# Patient Record
Sex: FEMALE | Race: WHITE | NOT HISPANIC OR LATINO | Employment: FULL TIME | URBAN - METROPOLITAN AREA
[De-identification: names, ages, dates, MRNs, and addresses within clinical notes are randomized per-mention and may not be internally consistent; named-entity substitution may affect disease eponyms.]

---

## 2018-06-09 ENCOUNTER — HOSPITAL ENCOUNTER (EMERGENCY)
Facility: CLINIC | Age: 38
Discharge: HOME | End: 2018-06-09
Attending: PHYSICIAN ASSISTANT | Admitting: PHYSICIAN ASSISTANT
Payer: COMMERCIAL

## 2018-06-09 ENCOUNTER — APPOINTMENT (OUTPATIENT)
Dept: GENERAL RADIOLOGY | Facility: CLINIC | Age: 38
End: 2018-06-09
Attending: PHYSICIAN ASSISTANT
Payer: COMMERCIAL

## 2018-06-09 VITALS
RESPIRATION RATE: 18 BRPM | OXYGEN SATURATION: 98 % | DIASTOLIC BLOOD PRESSURE: 96 MMHG | TEMPERATURE: 99 F | SYSTOLIC BLOOD PRESSURE: 160 MMHG

## 2018-06-09 DIAGNOSIS — J06.9 UPPER RESPIRATORY TRACT INFECTION, UNSPECIFIED TYPE: ICD-10-CM

## 2018-06-09 LAB
DEPRECATED S PYO AG THROAT QL EIA: NORMAL
SPECIMEN SOURCE: NORMAL

## 2018-06-09 PROCEDURE — 87081 CULTURE SCREEN ONLY: CPT | Performed by: PHYSICIAN ASSISTANT

## 2018-06-09 PROCEDURE — 99284 EMERGENCY DEPT VISIT MOD MDM: CPT | Mod: 25

## 2018-06-09 PROCEDURE — 71046 X-RAY EXAM CHEST 2 VIEWS: CPT

## 2018-06-09 PROCEDURE — 94640 AIRWAY INHALATION TREATMENT: CPT

## 2018-06-09 PROCEDURE — 25000125 ZZHC RX 250: Performed by: PHYSICIAN ASSISTANT

## 2018-06-09 PROCEDURE — 87880 STREP A ASSAY W/OPTIC: CPT | Performed by: PHYSICIAN ASSISTANT

## 2018-06-09 RX ORDER — ALBUTEROL SULFATE 90 UG/1
2 AEROSOL, METERED RESPIRATORY (INHALATION) EVERY 4 HOURS PRN
Qty: 1 INHALER | Refills: 0 | Status: SHIPPED | OUTPATIENT
Start: 2018-06-09

## 2018-06-09 RX ORDER — BENZONATATE 200 MG/1
200 CAPSULE ORAL 3 TIMES DAILY PRN
Qty: 21 CAPSULE | Refills: 0 | Status: SHIPPED | OUTPATIENT
Start: 2018-06-09

## 2018-06-09 RX ORDER — IPRATROPIUM BROMIDE AND ALBUTEROL SULFATE 2.5; .5 MG/3ML; MG/3ML
3 SOLUTION RESPIRATORY (INHALATION) ONCE
Status: COMPLETED | OUTPATIENT
Start: 2018-06-09 | End: 2018-06-09

## 2018-06-09 RX ADMIN — IPRATROPIUM BROMIDE AND ALBUTEROL SULFATE 3 ML: .5; 3 SOLUTION RESPIRATORY (INHALATION) at 17:48

## 2018-06-09 ASSESSMENT — ENCOUNTER SYMPTOMS
HEADACHES: 1
RHINORRHEA: 1
TROUBLE SWALLOWING: 1
VOMITING: 1
FEVER: 1
SORE THROAT: 1
COUGH: 1
APPETITE CHANGE: 0
SHORTNESS OF BREATH: 1

## 2018-06-09 NOTE — LETTER
June 9, 2018      To Whom It May Concern:      Ammy Todd was seen in our Emergency Department today, 06/09/18. Please excuse her from work tomorrow(6/10) and Monday (6/11). I expect her condition to improve over the next 2-3 days.  She may return to work/school when improved.    Sincerely,        Joyce Tran PA-C

## 2018-06-09 NOTE — ED PROVIDER NOTES
History     Chief Complaint:  Pharyngitis and Shortness of Breath    HPI   Ammy Todd is a 38 year old female with a history of allergy induced asthma, diabetes, fibromyalgia, high cholesterol and hypertension who presents to the emergency department today with a cough over the past 1 week and a sore throat and shortness of breath that started today. Associated symptoms include headache, congestion, bilateral ear pain, rhinorrhea, painful swallowing, and low grade fever measured highest at 100.5  F. The patient has not used any Tylenol or Ibuprofen. For her current symptoms, the patient has been using DayQuil, NyQuil and Mucinex, without much relief. The patient states that her  is sick with similar symptoms She denies hx of strep exposure. She denies any recent travel. The patient denies ongoing vomiting, abdominal pain, diarrhea, trouble swallowing, or any other acute symptoms. Of note the patient has not been taking her diabetes or hypertension medications as she has been busy with work and has not made it to her doctor for recheck.     Allergies:  No known drug allergies.    Medications:    Beclomethasone Dipropionate (QVAR IN)  Cetirizine HCl (ZYRTEC ALLERGY PO)  acetaminophen-codeine (TYLENOL/CODEINE #3) 300-30 MG per tablet  Cyanocobalamin (B-12) 250 MCG TABS  drospirenone-ethinyl estradiol (NEAL) 3-0.03 MG per tablet  fluticasone (FLONASE) 50 MCG/ACT nasal spray  Gabapentin (NEURONTIN PO)  LISINOPRIL PO  MetFORMIN HCl (GLUCOPHAGE PO)  Triamcinolone Acetonide (KENALOG EX)     Past Medical History:    Allergic rhinitis  Diabetes  Eczema  Fibromyalgia  High cholesterol  Hypertension  Asthma    Past Surgical History:    The patient does not have any pertinent past surgical history.    Family History:    No past pertinent family history.    Social History:  Smoking Status: Current Every Day Smoker   Smokeless Tobacco: Never used  Alcohol Use: Negative  Marital Status:       Review of  Systems   Constitutional: Positive for fever. Negative for appetite change.   HENT: Positive for congestion, ear pain, rhinorrhea, sore throat and trouble swallowing.    Respiratory: Positive for cough and shortness of breath.    Gastrointestinal: Positive for vomiting.   Neurological: Positive for headaches.   All other systems reviewed and are negative.    Physical Exam     Patient Vitals for the past 24 hrs:   BP Temp Temp src Heart Rate Resp SpO2   06/09/18 1723 (!) 160/96 99  F (37.2  C) Oral 110 18 98 %     Physical Exam  General: Resting comfortably.  Alert and oriented.   Head:  The scalp, face, and head appear normal   Eyes:  Conjunctivae and sclerae are normal   ENT:    The oropharynx is normal    Uvula is in the midline    Structures are symmetric. Mild erythema noted to the posterior oropharynx.    No tonsillar hypertrophy or exudate.    No trismus or airway compromise.   Neck:  No lymphadenopathy    Normal ROM.   CV:  Regular rate and rhythm     Normal S1/S2    No pathological murmur detected  Resp:  Lungs are clear to auscultation    No rales or wheezing    No tachypnea and no respiratory distress    Equal air entry throughout    Expiratory cough noted  MS:  Normal muscular tone   Skin:  No rash or acute skin lesions noted   Neuro: Speech is normal and fluent.     Emergency Department Course     Imaging:  Chest Xray  No acute disease.  As per radiology.    Laboratory:  Rapid Strep Screen: Negative  Beta Strep Culture: In process    Interventions:  1748 DuoNeb 3 mL Nebulization    Emergency Department Course:  Nursing notes and vitals reviewed.  1735 I performed an exam of the patient as documented above.   Swap of the throat taken. This was sent to lab for testing, results above.  Nebulization medications were administered as documented above.  The patient was sent for a chest xray while in the emergency department, findings above.     1820 I informed the patient of negative lab and imaging results. We  discussed outpatient management.    I personally reviewed the laboratory results with the patient and answered all related questions prior to discharge.   Findings and plan explained to the patient. Patient discharged home with instructions regarding supportive care, medications, and reasons to return. The importance of close follow-up was reviewed.     Impression & Plan      Medical Decision Making:  Ammy Todd is a 38 year old female who presents for evaluation of a sore throat, shortness of breath and a cough.   The patient presents hypertensive, but otherwise vitally stable.  She has also had mild associated URI symptoms. This is consistent with an upper respiratory tract infection.  CXR was obtained and was negative. There is no signs at this point of serious bacterial infection such as OM, RPA, epiglottitis, PTA, strep pharyngitis, pneumonia, sinusitis, meningitis, bacteremia. Rapid strep screen was negative; cultures pending. Nebulization treatment was given here in the ED with the patient noting improvement in her symptoms. No signs of dehydration. The patient was instructed that she will be contact in 2-3 days if her strep culture returns positive. I think she is safe to be discharged home. She was given prescriptions for albuterol, tessalon and Claritin. She was asked to follow-up with her primary care doctor in 2-3 days for recheck.  I also discussed with the patient the importance of taking her diabetes and blood pressure medications.  She does plan to address this with her primary care doctor in the next few days.  She is asked to return immediately for uncontrolled fever, vomiting, worsening shortness of breath, chest pain, or any other concerning symptoms.  All questions were answered prior to discharge.  Patient understands and agrees to this plan.      Diagnosis:    ICD-10-CM    1. Upper respiratory tract infection, unspecified type J06.9      Disposition:  Discharged    Discharge  Medications:  Discharge Medication List as of 6/9/2018  6:41 PM      START taking these medications    Details   albuterol (PROAIR HFA) 108 (90 Base) MCG/ACT Inhaler Inhale 2 puffs into the lungs every 4 hours as needed for shortness of breath / dyspnea, Disp-1 Inhaler, R-0, Local Print      benzonatate (TESSALON) 200 MG capsule Take 1 capsule (200 mg) by mouth 3 times daily as needed for cough, Disp-21 capsule, R-0, Local Print      loratadine-pseudoePHEDrine (CLARITIN-D 24-HOUR)  MG per 24 hr tablet Take 1 tablet by mouth daily, Disp-5 tablet, R-0, Local Print           Scribe Discloser:  I, Tadeo oCsta, am serving as a scribe on 6/9/2018 at 5:41 PM to personally document services performed by Joyce Tran PA based on my observations and the provider's statements to me.     6/9/2018   [facility] EMERGENCY DEPARTMENT       Joyce Tran PA-C  06/09/18 1935

## 2018-06-09 NOTE — ED AVS SNAPSHOT
[facility] Emergency Department    [address]    [facility] [address]    Phone:  [phone]    Fax:  [phone]                                       Ammy Todd   MRN: [id number]    Department:  [facility] Emergency Department   Date of Visit:  6/9/2018           Patient Information     Date Of Birth          1980        Your diagnoses for this visit were:     Upper respiratory tract infection, unspecified type        You were seen by Joyce Tran PA-C.      Follow-up Information     Follow up with Nima Robles In 2 days.    Why:  Recheck    Contact information:    [address] [facility] [address] [phone]          Follow up with [facility] Emergency Department.    Specialty:  EMERGENCY MEDICINE    Why:  If symptoms worsen    Contact information:    [address]  [facility] [address]  [phone]        Discharge Instructions       Discharge Instructions  Upper Respiratory Infection    The upper respiratory tract includes the sinuses, nasal passages, pharynx, and larynx. A URI, or upper respiratory infection, is an infection of any of the parts of the upper airway. Symptoms include runny nose, congestion, sneezing, sore throat, cough, and fever. URIs are almost always caused by a virus. Antibiotics do not help with viral infections, so are generally not prescribed. A URI is very contagious through coughing and nasal secretions; make sure you wash your hands often and clean surfaces after sneezing, coughing or touching them. While you should start to improve in 3 - 5 days, remember that sometimes a cough can linger for several weeks.    Generally, every Emergency Department visit should have a follow-up clinic visit with either a primary or a specialty clinic/provider. Please follow-up as instructed by your emergency provider today.    Return to the Emergency Department if:    Any of your  symptoms get much worse.    You seem very sick, like being too weak to get up.    You have chest pain or shortness of breath.     You have a severe headache.    You are vomiting (throwing up) so much you cannot keep fluids or medicines down.    You have confusion or seem unusually drowsy.    You have a seizure.    What can I do to help myself?    Fill any prescriptions the provider gave you and take them right away    If you have a fever, get plenty of rest and drink lots of fluids, especially water.    Using a humidifier or saline nose spray will also help loosen mucous.     Clothes or blankets will not change your fever. Do what is comfortable for you.    Bathing or sponging in lukewarm water may help you feel better.    Acetaminophen (Tylenol ) or ibuprofen (Advil , Motrin ) will help bring fever down and may help you feel more comfortable. Be sure to read and follow the package directions, and ask your provider if you have questions.    Do not drink alcohol.    Decongestants may help you feel better. You may use decongestant nose sprays Afrin  (oxymetazoline) or Ramy-Synephrine  (phenylephrine hydrochloride) for up to 3 days, or may use a decongestant tablet like Sudafed  (pseudoephedrine).  If you were given a prescription for medicine here today, be sure to read all of the information (including the package insert) that comes with your prescription.  This will include important information about the medicine, its side effects, and any warnings that you need to know about.  The pharmacist who fills the prescription can provide more information and answer questions you may have about the medicine.  If you have questions or concerns that the pharmacist cannot address, please call or return to the Emergency Department.   Remember that you can always come back to the Emergency Department if you are not able to see your regular provider in the amount of time listed above, if you get any new symptoms, or if there is  anything that worries you.      24 Hour Appointment Hotline       To make an appointment at any [facility], call [phone] ([phone]). If you don't have a family doctor or clinic, we will help you find one. [place] clinics are conveniently located to serve the needs of you and your family.             Review of your medicines      START taking        Dose / Directions Last dose taken    albuterol 108 (90 Base) MCG/ACT Inhaler   Commonly known as:  PROAIR HFA   Dose:  2 puff   Quantity:  1 Inhaler        Inhale 2 puffs into the lungs every 4 hours as needed for shortness of breath / dyspnea   Refills:  0        benzonatate 200 MG capsule   Commonly known as:  TESSALON   Dose:  200 mg   Quantity:  21 capsule        Take 1 capsule (200 mg) by mouth 3 times daily as needed for cough   Refills:  0        loratadine-pseudoePHEDrine  MG per 24 hr tablet   Commonly known as:  CLARITIN-D 24-hour   Dose:  1 tablet   Quantity:  5 tablet        Take 1 tablet by mouth daily   Refills:  0          Our records show that you are taking the medicines listed below. If these are incorrect, please call your family doctor or clinic.        Dose / Directions Last dose taken    acetaminophen-codeine 300-30 MG per tablet   Commonly known as:  TYLENOL WITH CODEINE #3   Dose:  1 tablet   Quantity:  14 tablet        Take 1 tablet by mouth every 6 hours as needed for pain   Refills:  0        B-12 250 MCG Tabs        Refills:  0        drospirenone-ethinyl estradiol 3-0.03 MG per tablet   Commonly known as:  NEAL   Dose:  1 tablet        Take 1 tablet by mouth daily   Refills:  0        fluticasone 50 MCG/ACT spray   Commonly known as:  FLONASE   Dose:  2 spray        Spray 2 sprays into both nostrils daily   Refills:  0        GLUCOPHAGE PO        Refills:  0        KENALOG EX        Refills:  0        LISINOPRIL PO        Refills:  0        NEURONTIN PO        Refills:  0        QVAR IN        Refills:  0         ZYRTEC ALLERGY PO        Refills:  0                Prescriptions were sent or printed at these locations (3 Prescriptions)                   Other Prescriptions                Printed at Department/Unit printer (3 of 3)         albuterol (PROAIR HFA) 108 (90 Base) MCG/ACT Inhaler               benzonatate (TESSALON) 200 MG capsule               loratadine-pseudoePHEDrine (CLARITIN-D 24-HOUR)  MG per 24 hr tablet                Procedures and tests performed during your visit     Beta strep group A culture    Chest XR,  PA & LAT    Rapid strep screen      Orders Needing Specimen Collection     None      Pending Results     Date and Time Order Name Status Description    6/9/2018 1746 Beta strep group A culture In process     6/9/2018 1743 Chest XR,  PA & LAT Preliminary             Pending Culture Results     Date and Time Order Name Status Description    6/9/2018 1746 Beta strep group A culture In process             Pending Results Instructions     If you had any lab results that were not finalized at the time of your Discharge, you can call the ED Lab Result RN at [phone]. You will be contacted by this team for any positive Lab results or changes in treatment. The nurses are available 7 days a week from 10A to 6:30P.  You can leave a message 24 hours per day and they will return your call.        Test Results From Your Hospital Stay        6/9/2018  6:09 PM      Narrative     CHEST TWO VIEWS 6/9/2018 5:53 PM     HISTORY: Cold symptoms for five days, cough, shortness of breath  today.    COMPARISON: None.     FINDINGS: There are no acute infiltrates. The cardiac silhouette is  not enlarged. Pulmonary vasculature is unremarkable.        Impression     IMPRESSION: No acute disease.          6/9/2018  5:57 PM      Component Results     Component    Specimen Description    Throat    Rapid Strep A Screen    NEGATIVE: No Group A streptococcal antigen detected by immunoassay, await culture report.          6/9/2018  5:57 PM                Clinical Quality Measure: Blood Pressure Screening     Your blood pressure was checked while you were in the emergency department today. The last reading we obtained was  BP: (!) 160/96 . Please read the guidelines below about what these numbers mean and what you should do about them.  If your systolic blood pressure (the top number) is less than 120 and your diastolic blood pressure (the bottom number) is less than 80, then your blood pressure is normal. There is nothing more that you need to do about it.  If your systolic blood pressure (the top number) is 120-139 or your diastolic blood pressure (the bottom number) is 80-89, your blood pressure may be higher than it should be. You should have your blood pressure rechecked within a year by a primary care provider.  If your systolic blood pressure (the top number) is 140 or greater or your diastolic blood pressure (the bottom number) is 90 or greater, you may have high blood pressure. High blood pressure is treatable, but if left untreated over time it can put you at risk for heart attack, stroke, or kidney failure. You should have your blood pressure rechecked by a primary care provider within the next 4 weeks.  If your provider in the emergency department today gave you specific instructions to follow-up with your doctor or provider even sooner than that, you should follow that instruction and not wait for up to 4 weeks for your follow-up visit.        Thank you for choosing [facility]       Thank you for choosing [facility] for your care. Our goal is always to provide you with excellent care. Hearing back from our patients is one way we can continue to improve our services. Please take a few minutes to complete the written survey that you may receive in the mail after you visit with us. Thank you!        [employer] Information     [employer] lets you send messages to your doctor, view your test results, renew your prescriptions, schedule  "appointments and more. To sign up, go to [email or web address] . Click on \"Log in\" on the left side of the screen, which will take you to the Welcome page. Then click on \"Sign up Now\" on the right side of the page.     You will be asked to enter the access code listed below, as well as some personal information. Please follow the directions to create your username and password.     Your access code is: [email or web address]  Expires: 2018  6:41 PM     Your access code will  in 90 days. If you need help or a new code, please call your [facility] clinic or [phone].        Care EveryWhere ID     This is your Care EveryWhere ID. This could be used by other organizations to access your [facility] medical records  [id number]        Equal Access to Services     PATRICIA PACHECO : Eren Hopkins, david zavala, rowdy hodges, lizzie nance. So [facility] [phone].    ATENCIÓN: Si habla español, tiene a tarango disposición servicios gratuitos de asistencia lingüística. Llame al [phone].    We comply with applicable federal civil rights laws and [place] laws. We do not discriminate on the basis of race, color, national origin, age, disability, sex, sexual orientation, or gender identity.            After Visit Summary       This is your record. Keep this with you and show to your community pharmacist(s) and doctor(s) at your next visit.                  "

## 2018-06-09 NOTE — DISCHARGE INSTRUCTIONS

## 2018-06-09 NOTE — ED TRIAGE NOTES
Pt has has cold symptoms x1 week. Today sore throat and SOB. + cough. Taking mucinex, has not taken anything for pain.

## 2018-06-09 NOTE — ED AVS SNAPSHOT
[facility] Emergency Department    [address]    [facility] [address]    Phone:  [phone]    Fax:  [phone]                                       Ammy Todd   MRN: [id number]    Department:  [facility] Emergency Department   Date of Visit:  6/9/2018           After Visit Summary Signature Page     I have received my discharge instructions, and my questions have been answered. I have discussed any challenges I see with this plan with the nurse or doctor.    ..........................................................................................................................................  Patient/Patient Representative Signature      ..........................................................................................................................................  Patient Representative Print Name and Relationship to Patient    ..................................................               ................................................  Date                                            Time    ..........................................................................................................................................  Reviewed by Signature/Title    ...................................................              ..............................................  Date                                                            Time

## 2018-06-11 LAB
BACTERIA SPEC CULT: NORMAL
Lab: NORMAL
SPECIMEN SOURCE: NORMAL

## 2020-11-29 ENCOUNTER — VIRTUAL VISIT (OUTPATIENT)
Dept: FAMILY MEDICINE | Facility: OTHER | Age: 40
End: 2020-11-29
Payer: COMMERCIAL

## 2020-11-29 DIAGNOSIS — Z20.822 SUSPECTED COVID-19 VIRUS INFECTION: Primary | ICD-10-CM

## 2020-11-29 PROCEDURE — 99421 OL DIG E/M SVC 5-10 MIN: CPT | Performed by: PHYSICIAN ASSISTANT

## 2020-11-29 NOTE — PROGRESS NOTES
"Date: 2020 01:12:52  Clinician: Radha Parr  Clinician NPI: [id number]  Patient: Ammy Todd  Patient : 1980  Patient Address: [address] [place] [address]  Patient Phone: [phone]  Visit Protocol: URI  Patient Summary:  Ammy is a 40 year old ( : 1980 ) female who initiated a [facility] Visit for COVID-19 (Coronavirus) evaluation and screening. When asked the question \"Please sign me up to receive news, health information and promotions from [facility].\", Ammy responded \"No\".    Ammy states her symptoms started 1-2 days ago.   Her symptoms consist of ear pain, a headache, a cough, nasal congestion, facial pain or pressure, myalgia, chills, malaise, tooth pain, and ageusia. She is experiencing mild difficulty breathing with activities but can speak normally in full sentences.   Symptom details     Nasal secretions: The color of her mucus is green, yellow, and clear.    Cough: Ammy coughs a few times an hour and her cough is not more bothersome at night. Phlegm does not come into her throat when she coughs. She does not believe her cough is caused by post-nasal drip.     Facial pain or pressure: The facial pain or pressure feels worse when bending over or leaning forward.     Headache: She states the headache is mild (1-3 on a 10 point pain scale).     Tooth pain: The tooth pain is not caused by a cavity, recent dental work, or other mouth problems.      Ammy denies having wheezing, fever, nausea, vomiting, rhinitis, sore throat, diarrhea, and anosmia. She also denies having recent facial or sinus surgery in the past 60 days and having a sinus infection within the past year.   Precipitating events  She has not recently been exposed to someone with influenza. Ammy has been in close contact with the following high risk individuals: people with asthma, heart disease or diabetes, immunocompromised people, and adults 65 or older.   Pertinent COVID-19 (Coronavirus) information  " Ammy does not work or volunteer as healthcare worker or a . 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   Ammy has had a close contact with a laboratory-confirmed COVID-19 patient within 14 days of symptom onset. She was exposed at her work. She does not know when she was exposed to the laboratory-confirmed COVID-19 patient.   Additional information about contact with COVID-19 (Coronavirus) patient as reported by the patient (free text): Stef Ott, staff at [place], Daniela Aragon and Maximus Calabrese clients living in [facility].    Since December 2019, Ammy has not been tested for COVID-19 and has not had upper respiratory infection or influenza-like illness.   Pertinent medical history  Ammy has diabetes. She has been told by her provider that her diabetes is not in control.   Ammy has asthma. She uses quick-relief inhaler less than two times per week. She refills her quick-relief inhaler less than two times per year. She wakes up at night with asthma symptoms less than two times per month.   Ammy has taken an antibiotic medication in the past month. Antibiotic details as reported by the patient (free text): Mucinex, congestion   She has not been told by her provider to avoid NSAIDs.   Ammy does not get yeast infections when she takes antibiotics.   She denies having immunosuppressive conditions (e.g., chemotherapy, HIV, organ transplant, long-term use of steroids or other immunosuppressive medications, splenectomy). She does not have severe COPD and congestive heart failure.   Ammy needs a return to work/school note.   Weight: 220 lbs   Ammy does not smoke or use smokeless tobacco.   She denies pregnancy and denies breastfeeding. She is  currently menstruating.   Weight: 220 lbs    MEDICATIONS: Wal-Zyr (cetirizine) oral, metformin oral, Prozac oral, ALLERGIES: NKDA  Clinician Response:  Dear Ammy,  Your health is our priority. Based on the information you have provided, it is possible that you may have some type of viral infection.  Please read the full treatment plan and see my recommendations below.  Medication information  Because you have a viral infection, antibiotics will not help you get better. Treating a viral infection with antibiotics could actually make you feel worse.  Self care  Steps you can take to be as comfortable as possible:     Rest.    Drink plenty of fluids.    Take a warm shower to loosen congestion    Use a cool-mist humidifier.    Take a spoonful of honey to reduce your cough.     As a reminder, your primary care provider is the best person to help you with your diabetes management. Knowing how and when to take your medication, how to monitor your blood sugar (glucose), and how to take care of yourself, helps you manage your diabetes better.   Please make an appointment with your primary care provider to follow up with any outstanding diabetes questions.   Additional treatment plan   Your symptoms show that you may have coronavirus (COVID-19). This illness can cause fever, cough and trouble breathing. Many people get a mild case and get better on their own. Some people can get very sick.  What should I do?  We would like to test you for this virus.   1. Please call [phone] to schedule your visit. Explain that you were referred by [facility] to have a COVID-19 test. Be ready to share your [facility] visit ID number.  * If you need to schedule in [facility] please call [phone] or for [employer] employees please call [phone].  * If you need to schedule in the [place] area please call [phone]. [employer] employees call [phone].  The following will serve as your written order for this COVID Test, ordered by me, for  "the indication of suspected COVID [Z20.828]: The test will be ordered in [employer], our electronic health record, after you are scheduled. It will show as ordered and authorized by Robert Parra MD.  Order: COVID-19 (Coronavirus) PCR for SYMPTOMATIC [facility].   2. When it's time for your COVID test:  Stay at least 6 feet away from others. (If someone will drive you to your test, stay in the backseat, as far away from the  as you can.)   Cover your mouth and nose with a mask, tissue or washcloth.  Go straight to the testing site. Don't make any stops on the way there or back.      3.Starting now: Stay home and away from others (self-isolate) until:   You've had no fever---and no medicine that reduces fever---for one full day (24 hours). And...   Your other symptoms have gotten better. For example, your cough or breathing has improved. And...   At least 10 days have passed since your symptoms started.       During this time, don't leave the house except for testing or medical care.   Stay in your own room, even for meals. Use your own bathroom if you can.   Stay away from others in your home. No hugging, kissing or shaking hands. No visitors.  Don't go to work, school or anywhere else.    Clean \"high touch\" surfaces often (doorknobs, counters, handles, etc.). Use a household cleaning spray or wipes. You'll find a full list of  on the EPA website: www.epa.gov/pesticide-registration/list-n-disinfectants-use-against-sars-cov-2.   Cover your mouth and nose with a mask, tissue or washcloth to avoid spreading germs.  Wash your hands and face often. Use soap and water.  Caregivers in these groups are at risk for severe illness due to COVID-19:  o People 65 years and older  o People who live in a nursing home or long-term care facility  o People with chronic disease (lung, heart, cancer, diabetes, kidney, liver, immunologic)  o People who have a weakened immune system, including those who:   Are in cancer " treatment  Take medicine that weakens the immune system, such as corticosteroids  Had a bone marrow or organ transplant  Have an immune deficiency  Have poorly controlled HIV or AIDS  Are obese (body mass index of 40 or higher)  Smoke regularly   o Caregivers should wear gloves while washing dishes, handling laundry and cleaning bedrooms and bathrooms.  o Use caution when washing and drying laundry: Don't shake dirty laundry, and use the warmest water setting that you can.  o For more tips, go to www.cdc.gov/coronavirus/2019-ncov/downloads/10Things.pdf.    4.Sign up for [employer]. We know it's scary to hear that you might have COVID-19. We want to track your symptoms to make sure you're okay over the next 2 weeks. Please look for an email from [employer]---this is a free, online program that we'll use to keep in touch. To sign up, follow the link in the email. Learn more at [email or web address]  How can I take care of myself?   Get lots of rest. Drink extra fluids (unless a doctor has told you not to).   Take Tylenol (acetaminophen) for fever or pain. If you have liver or kidney problems, ask your family doctor if it's okay to take Tylenol.   Adults can take either:    650 mg (two 325 mg pills) every 4 to 6 hours, or...   1,000 mg (two 500 mg pills) every 8 hours as needed.    Note: Don't take more than 3,000 mg in one day. Acetaminophen is found in many medicines (both prescribed and over-the-counter medicines). Read all labels to be sure you don't take too much.   For children, check the Tylenol bottle for the right dose. The dose is based on the child's age or weight.    If you have other health problems (like cancer, heart failure, an organ transplant or severe kidney disease): Call your specialty clinic if you don't feel better in the next 2 days.       Know when to call 911. Emergency warning signs include:    Trouble breathing or shortness of breath Pain or pressure in the chest that doesn't  go away Feeling confused like you haven't felt before, or not being able to wake up Bluish-colored lips or face.  Where can I get more information?   [facility] -- About COVID-19: [email or web address]   CDC -- What to Do If You're Sick: www.cdc.gov/coronavirus/2019-ncov/about/steps-when-sick.html   CDC -- Ending Home Isolation: www.cdc.gov/coronavirus/2019-ncov/hcp/disposition-in-home-patients.html   CDC -- Caring for Someone: www.cdc.gov/coronavirus/2019-ncov/if-you-are-sick/care-for-someone.html   [facility] -- Interim Guidance for Hospital Discharge to Home: www.[email or web address].mn./diseases/coronavirus/hcp/hospdischarge.pdf   [facility] clinical trials (COVID-19 research studies): clinicalaffairs.[facility]-clinical-trials    Below are the COVID-19 hotlines at the [place] Department of Health ([facility]). Interpreters are available.    For health questions: Call 550-838-1617 or 1-956.764.1809 (7 a.m. to 7 p.m.) For questions about schools and childcare: Call 912-470-1264 or 1-633.508.9439 (7 a.m. to 7 p.m.)    COVID-19 (Coronavirus) General Information  Because there is currently no vaccine to prevent infection, the best way to protect yourself is to avoid being exposed to this virus. Common symptoms of COVID-19 include but are not limited to fever, cough, and shortness of breath. These symptoms appear 2-14 days after you are exposed to the virus that causes COVID-19. Click here for more information from the CDC on how to protect yourself.  If you are sick with COVID-19 or suspect you are infected with the virus that causes COVID-19, follow the steps here from the CDC to help prevent the disease from spreading to people in your home and community.  Click here for general information from the CDC on testing.  If you develop any of these emergency warning signs for COVID-19, get medical attention immediately:     Trouble breathing    Persistent pain or pressure in the chest    New confusion or  inability to arouse    Bluish lips or face      Call your doctor or clinic before going in. Call 911 if you have a medical emergency and notify the  you have or think you may have COVID-19.  For more detailed and up to date information on COVID-19 (Coronavirus), please visit the CDC website.   Diagnosis: Cough  Diagnosis ICD: R05

## 2020-11-30 DIAGNOSIS — Z20.822 SUSPECTED COVID-19 VIRUS INFECTION: ICD-10-CM

## 2020-11-30 PROCEDURE — U0003 INFECTIOUS AGENT DETECTION BY NUCLEIC ACID (DNA OR RNA); SEVERE ACUTE RESPIRATORY SYNDROME CORONAVIRUS 2 (SARS-COV-2) (CORONAVIRUS DISEASE [COVID-19]), AMPLIFIED PROBE TECHNIQUE, MAKING USE OF HIGH THROUGHPUT TECHNOLOGIES AS DESCRIBED BY CMS-2020-01-R: HCPCS | Performed by: PHYSICIAN ASSISTANT

## 2020-12-01 LAB
SARS-COV-2 RNA SPEC QL NAA+PROBE: ABNORMAL
SPECIMEN SOURCE: ABNORMAL

## 2021-01-14 ENCOUNTER — HEALTH MAINTENANCE LETTER (OUTPATIENT)
Age: 41
End: 2021-01-14

## 2021-10-23 ENCOUNTER — HEALTH MAINTENANCE LETTER (OUTPATIENT)
Age: 41
End: 2021-10-23

## 2022-02-12 ENCOUNTER — HEALTH MAINTENANCE LETTER (OUTPATIENT)
Age: 42
End: 2022-02-12

## 2022-02-16 ENCOUNTER — APPOINTMENT (OUTPATIENT)
Dept: CT IMAGING | Facility: CLINIC | Age: 42
End: 2022-02-16
Attending: EMERGENCY MEDICINE
Payer: OTHER MISCELLANEOUS

## 2022-02-16 ENCOUNTER — HOSPITAL ENCOUNTER (EMERGENCY)
Facility: CLINIC | Age: 42
Discharge: HOME | End: 2022-02-16
Attending: EMERGENCY MEDICINE | Admitting: EMERGENCY MEDICINE
Payer: OTHER MISCELLANEOUS

## 2022-02-16 VITALS
TEMPERATURE: 96.6 F | HEART RATE: 94 BPM | DIASTOLIC BLOOD PRESSURE: 94 MMHG | RESPIRATION RATE: 18 BRPM | SYSTOLIC BLOOD PRESSURE: 165 MMHG | OXYGEN SATURATION: 98 %

## 2022-02-16 DIAGNOSIS — S10.93XA CONTUSION OF FACE, SCALP AND NECK, INITIAL ENCOUNTER: ICD-10-CM

## 2022-02-16 DIAGNOSIS — S00.83XA CONTUSION OF FACE, SCALP AND NECK, INITIAL ENCOUNTER: ICD-10-CM

## 2022-02-16 DIAGNOSIS — S00.03XA CONTUSION OF FACE, SCALP AND NECK, INITIAL ENCOUNTER: ICD-10-CM

## 2022-02-16 DIAGNOSIS — S09.90XA CLOSED HEAD INJURY, INITIAL ENCOUNTER: ICD-10-CM

## 2022-02-16 PROCEDURE — 99285 EMERGENCY DEPT VISIT HI MDM: CPT | Mod: 25

## 2022-02-16 PROCEDURE — 70450 CT HEAD/BRAIN W/O DYE: CPT

## 2022-02-16 PROCEDURE — 70486 CT MAXILLOFACIAL W/O DYE: CPT

## 2022-02-16 PROCEDURE — 93005 ELECTROCARDIOGRAM TRACING: CPT

## 2022-02-16 ASSESSMENT — ENCOUNTER SYMPTOMS
NECK PAIN: 1
EYE PAIN: 0
VOMITING: 0
NUMBNESS: 1
NAUSEA: 0
ABDOMINAL PAIN: 0
ARTHRALGIAS: 1

## 2022-02-17 LAB
ATRIAL RATE - MUSE: 86 BPM
DIASTOLIC BLOOD PRESSURE - MUSE: NORMAL MMHG
INTERPRETATION ECG - MUSE: NORMAL
P AXIS - MUSE: 32 DEGREES
PR INTERVAL - MUSE: 156 MS
QRS DURATION - MUSE: 92 MS
QT - MUSE: 356 MS
QTC - MUSE: 426 MS
R AXIS - MUSE: 18 DEGREES
SYSTOLIC BLOOD PRESSURE - MUSE: NORMAL MMHG
T AXIS - MUSE: 34 DEGREES
VENTRICULAR RATE- MUSE: 86 BPM

## 2022-02-17 NOTE — ED PROVIDER NOTES
History   Chief Complaint:  Head Injury       HPI   Ammy Todd is a 41 year old female who presents after slipping and falling on ice a couple hours ago. She landed on her back and hit her head. She is not sure if she lost consciousness. She was able to get up and walk after. She has some jaw pain and neck pain. She mentions having some numbness in her teeth but no misalignment or dental pain. She denies nausea, vomiting, speech, or vision changes. She denies ear pain or discharge.  She denies abdominal or chest pain.     Review of Systems   Eyes: Negative for pain.   Cardiovascular: Negative for chest pain.   Gastrointestinal: Negative for abdominal pain, nausea and vomiting.   Musculoskeletal: Positive for arthralgias (jaw pain) and neck pain.   Neurological: Positive for numbness. Negative for syncope.   All other systems reviewed and are negative.        Allergies:  The patient has no known allergies.     Medications:  Albuterol  Tessalon  Jessie  Flonase  Neurontin  Lisinopril  Glucophage    Past Medical History:     DM  Eczema  Fibromyalgia  High cholesterol  Hypertension  Asthma    Social History:  The patient presents with daughter    Physical Exam     Patient Vitals for the past 24 hrs:   BP Temp Temp src Pulse Resp SpO2   02/16/22 1927 (!) 165/94 (!) 96.6  F (35.9  C) Temporal 94 18 98 %       Physical Exam    Gen: alert  HEENT: PERRL, oropharynx clear, no intraoral laceration or dental trauma, left mandibular tenderness, no trismus, large occipital hematoma  Ears: TM's normal bilaterally  Neck: Full AROM, no paraspinous tenderness, no midline tenderness  CV: RRR, no murmurs, 2+ pulses in all extremities  Chest wall: no crepitus, no tenderness  Pulm: breath sounds equal, lungs clear  Abd: Soft, no tenderness  Back: no thoracic midline tenderness, no lumbar midline tenderness, no paraspinous tenderness  RUE: no tenderness, full AROM  LUE: no tenderness, full AROM  RLE: no tenderness, full AROM  LLE:  no tenderness, full AROM  Skin: no laceration  Neuro: GCS 15, moves all extremities without focal weakness, sensation grossly intact over all distal extremities, no facial droop, PERRL, EOMI    Emergency Department Course   ECG  ECG taken at 1937, ECG read at 1949  Normal sinus rhythm   Rate 86 bpm. OK interval 156 ms. QRS duration 92 ms. QT/QTc 356/426 ms. P-R-T axes 32 18 34.     Imaging:  CT Facial Bones without Contrast   Final Result   IMPRESSION:   HEAD CT:   1.  Normal head CT.      FACIAL BONE CT:   1.  No facial bone or mandibular fracture.   2.  Dental disease.      Head CT w/o contrast   Final Result   IMPRESSION:   HEAD CT:   1.  Normal head CT.      FACIAL BONE CT:   1.  No facial bone or mandibular fracture.   2.  Dental disease.        Report per radiology    Emergency Department Course:    Reviewed:  I reviewed nursing notes, vitals, past medical history and Care Everywhere    Assessments:  1942 I obtained history and examined the patient as noted above.     2048 I rechecked the patient and explained findings.     Disposition:  The patient was discharged to home.     Impression & Plan     Medical Decision Making:  The patient presents for evaluation of a head injury.  Due to the patient's large occipital hematoma, they were at risk of intracranial hemorrhage.  CT of the head was obtained and is negative for acute ICH or skull fracture.  The CT head result was discussed with the patient.  Pt also with jaw pain after the fall.  CT facial bones negative and no obvious dental injury.  Discussed need to see dentistry for dental caries.  Head injury precautions and reasons to return to the emergency department were discussed.     Based on a full exam, I did not have concern for any additional traumatic injuries.  The pt describes a mechanical accident and therefore no additional evaluation for metabolic or cardiac etiology was warranted at this time.     Diagnosis:    ICD-10-CM    1. Closed head injury,  initial encounter  S09.90XA    2. Contusion of face, scalp and neck, initial encounter  S00.83XA     S00.03XA     S10.93XA        Scribe Disclosure:  I, Jonathan Rosales, am serving as a scribe at 7:35 PM on 2/16/2022 to document services personally performed by Tonya Jang MD based on my observations and the provider's statements to me.            Tonya Jang MD  02/17/22 1129

## 2022-02-17 NOTE — ED TRIAGE NOTES
Pt arrives to ED after slipping and falling on the ice at work. Pt states she landed on the back of her head and her back. Pt states she was confused, unsure if LOC. Able to walk into ED, GCS 15. Pain 6/10, dizziness and jaw pain. Neuros intact. Denies thinners.

## 2022-10-03 ENCOUNTER — HOSPITAL ENCOUNTER (EMERGENCY)
Facility: CLINIC | Age: 42
Discharge: HOME | End: 2022-10-03
Attending: EMERGENCY MEDICINE | Admitting: EMERGENCY MEDICINE
Payer: COMMERCIAL

## 2022-10-03 VITALS
SYSTOLIC BLOOD PRESSURE: 139 MMHG | DIASTOLIC BLOOD PRESSURE: 94 MMHG | OXYGEN SATURATION: 97 % | HEART RATE: 94 BPM | TEMPERATURE: 97 F | RESPIRATION RATE: 16 BRPM

## 2022-10-03 DIAGNOSIS — L03.90 CELLULITIS, UNSPECIFIED CELLULITIS SITE: ICD-10-CM
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
## 2022-10-03 PROCEDURE — 99284 EMERGENCY DEPT VISIT MOD MDM: CPT

## 2022-10-03 PROCEDURE — 90471 IMMUNIZATION ADMIN: CPT | Performed by: EMERGENCY MEDICINE

## 2022-10-03 PROCEDURE — 250N000011 HC RX IP 250 OP 636: Performed by: EMERGENCY MEDICINE

## 2022-10-03 PROCEDURE — 250N000013 HC RX MED GY IP 250 OP 250 PS 637: Performed by: EMERGENCY MEDICINE

## 2022-10-03 PROCEDURE — 90715 TDAP VACCINE 7 YRS/> IM: CPT | Performed by: EMERGENCY MEDICINE

## 2022-10-03 RX ORDER — IBUPROFEN 600 MG/1
600 TABLET, FILM COATED ORAL ONCE
Status: DISCONTINUED | OUTPATIENT
Start: 2022-10-03 | End: 2022-10-03 | Stop reason: HOSPADM

## 2022-10-03 RX ADMIN — CLOSTRIDIUM TETANI TOXOID ANTIGEN (FORMALDEHYDE INACTIVATED), CORYNEBACTERIUM DIPHTHERIAE TOXOID ANTIGEN (FORMALDEHYDE INACTIVATED), BORDETELLA PERTUSSIS TOXOID ANTIGEN (GLUTARALDEHYDE INACTIVATED), BORDETELLA PERTUSSIS FILAMENTOUS HEMAGGLUTININ ANTIGEN (FORMALDEHYDE INACTIVATED), BORDETELLA PERTUSSIS PERTACTIN ANTIGEN, AND BORDETELLA PERTUSSIS FIMBRIAE 2/3 ANTIGEN 0.5 ML: 5; 2; 2.5; 5; 3; 5 INJECTION, SUSPENSION INTRAMUSCULAR at 04:02

## 2022-10-03 RX ADMIN — AMOXICILLIN AND CLAVULANATE POTASSIUM 1 TABLET: 875; 125 TABLET, FILM COATED ORAL at 04:01

## 2022-10-03 ASSESSMENT — ENCOUNTER SYMPTOMS
FEVER: 0
ARTHRALGIAS: 1
WOUND: 1

## 2022-10-03 NOTE — ED PROVIDER NOTES
History   Chief Complaint:  Dog bite     The history is provided by the patient.      Ammy Todd is a 42 year old female with history of obesity, hyperlipidemia, and type 2 diabetes who presents with dog bite. Patient reports she was bit by her sister's dog 2 days ago. She states it was a provoked attack while she was closing the gate. She is here at the ED because she noticed pus draining from a puncture wound on her right thumb. She endorses increasing pain going up her right wrist and forearm that worsens with movement. She states the dog is up to date on shots. She denies fever.    Review of Systems   Constitutional: Negative for fever.   Musculoskeletal: Positive for arthralgias (R wrist and forearm).   Skin: Positive for wound (Right thumb wound with drainage).   All other systems reviewed and are negative.        Allergies:  No known drug allergies    Medications:  Albuterol  Beclomethasone dipropionate  Benzonatate  Cetirizine  Cyanocobalamin  Jessie  Fluticasone  Gabapentin  Lisinopril   Metformin  Levalbuterol  Sitagliptin  Cholecalciferol  Diclofenac  Cyclobenzaprine  Atorvastatin   Montelukast  Buspirone    Past Medical History:     Tobacco use  MDD  Hyperlipidemia  Type 2 diabetes mellitus  OCD  Fibromyalgia  Asthma  Fatty liver disease, nonalcoholic  ADD  UTI  Fibromyalgia  Neurocognitive disorder  Polycystic ovarian syndrome  Vitamin B12 deficiency  Vitamin D deficiency  Eczema  Allergic rhinitis  Obesity    Past Surgical History:    Tubal ligation    Family History:    COPD  Diabetes mellitus type 2  Stroke  Hyperlipidemia  Schizophrenia  Thyroid disorder    Social History:  The patient presents to the ED alone  PCP: Mar Elizabeth     Physical Exam     Patient Vitals for the past 24 hrs:   BP Temp Temp src Pulse Resp SpO2   10/03/22 0049 (!) 139/94 97  F (36.1  C) Temporal 94 16 97 %     Physical Exam    Consitutional: well-appearing  MSK: brisk cap refill right fingers. Puncture wound  noted to fat pad of right thumb. No active purulence. No fluctuation. Mild surrounding erythema. No significant swelling of right thumb. FROM with mild pain. No warmth noted to right forearm. Mild pain noted with movement of thumb and wrist.   Neuro: right upper extremity strength and sensation are intact  Derm: puncture wound to thumb with mild erythema    Emergency Department Course     Emergency Department Course:      Reviewed:  I reviewed nursing notes, vitals, past medical history and Care Everywhere.    Assessments:  0340 I obtained history and examined the patient as noted above.    I rechecked the patient and explained findings.     Interventions:  0401 Augmentin 1 tablet PO  0402 Tdap 0.5 mL IM    Disposition:  The patient was discharged to home.     Impression & Plan     Medical Decision Making:     Ammy Todd is a 42 year old female who presents for evaluation of a dog bite to.  The workup here in the ED shows no signs of compartment syndrome, significant lacerations, tendon or bone injury.  No signs of foreign body or dog teeth in wound.  Dog is known so will have them observe for signs of rabies; no rabies shots indicated from ED.  Mild cellulitis is predent but appropiate for outpatient trial. Will start augmentin and have them observe for signs of infection (pain, redness, warmth, red streaks, etc).      Diagnosis:    ICD-10-CM    1. Dog bite, initial encounter  W54.0XXA    2. Cellulitis, unspecified cellulitis site  L03.90      Discharge Medications:  Discharge Medication List as of 10/3/2022  4:00 AM      START taking these medications    Details   amoxicillin-clavulanate (AUGMENTIN) 875-125 MG tablet Take 1 tablet by mouth 2 times daily, Disp-20 tablet, R-0, [facility]           Scribe Disclosure:  EMILIA, Kamilah Potter, am serving as a scribe at 3:54 AM on 10/3/2022 to document services personally performed by Bhupinder Rhodes MD based on my observations and the provider's statements to me.      Bhupinder Rhodes MD  10/03/22 0741

## 2022-10-03 NOTE — ED TRIAGE NOTES
"Bit my sisters dog on right hand. Shot's UTD. Pt here d/t pain traveling up arm and \"my thumb looks like its getting infected\". Purulent drainage from right thumb puncture wound. No drainage from palm wound.       "

## 2022-10-10 ENCOUNTER — HEALTH MAINTENANCE LETTER (OUTPATIENT)
Age: 42
End: 2022-10-10

## 2023-03-25 ENCOUNTER — HEALTH MAINTENANCE LETTER (OUTPATIENT)
Age: 43
End: 2023-03-25

## 2023-05-22 ENCOUNTER — APPOINTMENT (OUTPATIENT)
Dept: GENERAL RADIOLOGY | Facility: CLINIC | Age: 43
End: 2023-05-22
Payer: OTHER MISCELLANEOUS

## 2023-05-22 ENCOUNTER — APPOINTMENT (OUTPATIENT)
Dept: GENERAL RADIOLOGY | Facility: CLINIC | Age: 43
End: 2023-05-22
Attending: EMERGENCY MEDICINE
Payer: OTHER MISCELLANEOUS

## 2023-05-22 ENCOUNTER — HOSPITAL ENCOUNTER (EMERGENCY)
Facility: CLINIC | Age: 43
Discharge: HOME | End: 2023-05-22
Payer: OTHER MISCELLANEOUS

## 2023-05-22 VITALS
HEART RATE: 90 BPM | DIASTOLIC BLOOD PRESSURE: 74 MMHG | WEIGHT: 201 LBS | OXYGEN SATURATION: 97 % | SYSTOLIC BLOOD PRESSURE: 129 MMHG | TEMPERATURE: 98.2 F | RESPIRATION RATE: 18 BRPM
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
## 2023-05-22 DIAGNOSIS — T14.8XXA SKIN ABRASION: ICD-10-CM

## 2023-05-22 DIAGNOSIS — S62.316A DISPLACED FRACTURE OF BASE OF FIFTH METACARPAL BONE, RIGHT HAND, INITIAL ENCOUNTER FOR CLOSED FRACTURE: ICD-10-CM

## 2023-05-22 DIAGNOSIS — S80.02XA CONTUSION OF LEFT KNEE, INITIAL ENCOUNTER: ICD-10-CM

## 2023-05-22 PROCEDURE — 250N000013 HC RX MED GY IP 250 OP 250 PS 637

## 2023-05-22 PROCEDURE — 73110 X-RAY EXAM OF WRIST: CPT | Mod: RT

## 2023-05-22 PROCEDURE — 26600 TREAT METACARPAL FRACTURE: CPT | Mod: RT

## 2023-05-22 PROCEDURE — 99285 EMERGENCY DEPT VISIT HI MDM: CPT | Mod: 25

## 2023-05-22 PROCEDURE — 73562 X-RAY EXAM OF KNEE 3: CPT | Mod: LT

## 2023-05-22 PROCEDURE — 73130 X-RAY EXAM OF HAND: CPT | Mod: RT

## 2023-05-22 RX ORDER — ACETAMINOPHEN 325 MG/1
650 TABLET ORAL ONCE
Status: COMPLETED | OUTPATIENT
Start: 2023-05-22 | End: 2023-05-22

## 2023-05-22 RX ADMIN — ACETAMINOPHEN 650 MG: 325 TABLET ORAL at 22:09

## 2023-05-22 ASSESSMENT — ACTIVITIES OF DAILY LIVING (ADL): ADLS_ACUITY_SCORE: 35

## 2023-05-22 NOTE — LETTER
May 22, 2023      To Whom It May Concern:      Ammy Todd was seen in our Emergency Department today, 05/22/23.  No use of the R hand or lifting with the R hand until she follows up with [facility].  She may return to work/school when improved.    Sincerely,        Sarah LIN RN

## 2023-05-22 NOTE — ED TRIAGE NOTES
Pt reports that she was running from a dog and fell onto the driveway, pt has abrasion to BLE and pain to right hand and wrist. PT VSS and ABC's intact, hit head, no LOC, no thinners, no neck pain

## 2023-05-23 NOTE — ED PROVIDER NOTES
Emergency Department Attending Supervision Note  5/22/2023  10:02 PM      I evaluated this patient with MARINA Hutchinson.       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  She did not hit her head, denies LOC.      On my exam, scattered superficial abrasions to her right hand with swelling over the fourth and fifth metacarpals, distal sensation is intact.  Abrasion over left knee without focal bony tenderness.    Workup is notable for:  XR Knee Left 3 Views   Final Result   IMPRESSION: Normal joint spaces and alignment. No fracture or joint effusion.      XR Wrist Right G/E 3 Views   Final Result   IMPRESSION:    1.  Small minimally displaced intra-articular fracture of the fifth metacarpal base.   2.  Normal joint alignment.   3.  Moderate soft tissue swelling in the dorsal hand.      XR Hand Right G/E 3 Views   Final Result   IMPRESSION:    1.  Small minimally displaced intra-articular fracture of the fifth metacarpal base.   2.  Normal joint alignment.   3.  Moderate soft tissue swelling in the dorsal hand.            In summary, my impression is minimally displaced intra-articular fracture of the right fifth metacarpal, multiple abrasions to hand and knees, left knee contusion without fracture.  She was placed in a ulnar gutter splint, recommended orthopedic follow-up.  Recommended Tylenol, ibuprofen, elevation and ice to her sore areas.    (S62.316A) Displaced fracture of base of fifth metacarpal bone, right hand, initial encounter for closed fracture  (S80.02XA) Contusion of left knee, initial encounter  (W19.XXXA) Fall, initial encounter  (T14.8XXA) Skin abrasion    Disposition:  Discharge    Kimani Oliva MD  [facility]  [facility] Emergency Department    10:02 PM 05/22/23             Kimani Oliva MD  05/23/23 0056

## 2023-05-23 NOTE — ED PROVIDER NOTES
History     Chief Complaint:  Fall       HPI   Ammy Todd is a 43 year old female, up to date on tetanus vaccination, with a history of diabetes 2 mellitus, hypertension, hyperlipidemia and fibromyalgia who presents with right wrist and left knee pain after a fall. The patient reports that she fell while being chased by a dog, as she was dropping off a package as an [employer] , and landing on her outstretched right wrist and left knee. The patient states that she first felt onto her knees and then rolled onto the front of her right head, but denies hitting her head hard. No loss of consciousness, severe headache, dizziness, visual symptoms, confusion, behavior change.  No neck pain.  The patient states that she was not bitten by the dog at any point in time. The patient reports that although she can walk on her left leg, it is very painful to do so due to pain and she has abrasions on her legs.  She is up-to-date on her tetanus. The patient states that her right hand feels swollen and painful.  She mentions a bit of numbness and tingling in the right small finger.. The patient reports that she has no injury, aside from scrapes and abrasions, to her legs and right hand.     Independent Historian:   None - Patient Only    Review of External Notes:   Tetanus updated 10/3/2022 on chart review      Medications:    Codeine  Proair HFA  Augmentin  Qvar in  Tessalon  Zyrtec  Jessie  Flonase  Neurontin  Lisinopril   Claritin  Glucophage  Kenalog  Aspirin 81  Prozac  Lexapro   Arnuity Ellipta  Flexeril   Robaxin   Naprosyn   Xopenex   Patanol   Januvia   Singulair  Zanaflex  Vitamin B12  Vitamin D3    Past Medical History:    Allergic rhinitis  Type 2 diabetes mellitus  Eczema  Fibromyalgia   Hyperlipidemia   Hypertension  Asthma  Depression   OCD   Pharyngitis  Sinusitis  Conjunctivitis   Tobacco use   Fatty liver diease, non alcoholic   ADD  Neurocognitive disorder  Hypertriglyceridemia   PCOS   Vitamin B12  deficiency  Vitamin D deficiency   Obesity   Patellar subluxation   UTI    Past Surgical History:    Tubal ligation     Physical Exam     Patient Vitals for the past 24 hrs:   BP Temp Temp src Pulse Resp SpO2 Weight   05/22/23 2342 129/74 -- -- 90 -- 97 % --   05/22/23 1717 (!) 142/97 98.2  F (36.8  C) Temporal 105 18 98 % 91.2 kg (201 lb)        Physical Exam  General: Pleasant middle aged female sitting on gurney.  HENT:   Ears: No hemotympanum.  Head: Atraumatic.  No raccoon eyes or mccartney signs.  Mouth/Throat: Oropharynx clear and moist.  Eyes: Conjunctive and EOM normal. PERRLA.  Neck: Normal ROM. No rigidity.  No midline C-spine tenderness.  CV: Regular rate and rhythm.  Good capillary refill on the digits of the right hand and DP pulse intact.  Resp: Normal respiratory effort.   GI:  Abdomen soft, non distended and nontender.   MSK: Right upper extremity - patient has tenderness and swelling over the right fifth metacarpal.  No tenderness over the distal radius or ulna.  No tenderness in the anatomic snuffbox.  Left lower extremity-tenderness over the proximal tibia where her abrasion is.  Patient able to ambulate.  Skin: Patient has superficial abrasions on bilateral shins and on right hand.  There are no foreign bodies.  Neuro: Awake, alert, oriented x 3.  Sensation to light touch intact right upper extremity.  Psych: Normal mood and affect.      Emergency Department Course     Imaging:  XR Knee Left 3 Views   Final Result   IMPRESSION: Normal joint spaces and alignment. No fracture or joint effusion.      XR Wrist Right G/E 3 Views   Final Result   IMPRESSION:    1.  Small minimally displaced intra-articular fracture of the fifth metacarpal base.   2.  Normal joint alignment.   3.  Moderate soft tissue swelling in the dorsal hand.      XR Hand Right G/E 3 Views   Final Result   IMPRESSION:    1.  Small minimally displaced intra-articular fracture of the fifth metacarpal base.   2.  Normal joint alignment.    3.  Moderate soft tissue swelling in the dorsal hand.         Report per radiology    Procedures     Splint Placement     Procedure: Splint Placement     Indication: Fracture    Consent: Verbal     Location: Right Wrist    Preparation: Wounds were cleansed and dressed with a non-adherent bandage     Procedure detail:   Splint was applied by Tech/Nurse with my assistance  Splint type: Ulnar gutter   Splint materilal: Fiberglass  After placement I checked and adjusted the fit as needed to ensure proper positioning/fit  Sensation and circulation are intact after splint placement     Patient Status: The patient tolerated the procedure well: Yes. There were no complications.      Emergency Department Course & Assessments:    Interventions:  Medications   acetaminophen (TYLENOL) tablet 650 mg (650 mg Oral $Given 5/22/23 2209)        Assessments:  2224 I obtained history and examined the patient as above.  2245 I rechecked the patient and explained findings.   2330 I rechecked the patient. Ulnar gutter splint was placed by ED tech and fit was checked afterwards.     Independent Interpretation (X-rays, CTs, rhythm strip):  I reviewed the patient's hand x-ray and noted a fifth metacarpal fracture.   I reviewed the patient's knee x-ray and noted no fractures.     Consultations/Discussion of Management or Tests:  2159 I staffed the patient with Dr. Oliva.     Social Determinants of Health affecting care:   None    Disposition:  The patient was discharged to home.     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  She fell onto her knees and right hand first, and then rolled onto her head.  There was no loss of consciousness, severe headache, dizziness, amnesia, change in behavior, nausea or vomiting to warrant advanced head imaging.  No  midline C-spine tenderness or neck pain to warrant advanced imaging of the neck.  Patient had abrasions on her bilateral shins, tetanus up-to-date (2022).  Had some tenderness over the left proximal tibia and was initially guarded with ambulation.  X-ray imaging did not show fracture and I believe her discomfort is secondary to a contusion.  Abrasions were cleaned copiously and sterile dressings were applied.  Regarding her right hand, x-ray imaging found her to have a right fifth mildly displaced metacarpal fracture.  This warranted splinting with an ulnar gutter splint.  She will call [facility] orthopedics per the referral I provided to her to get an appointment for recheck in the next 5 to 7 days.  Discussed returning sooner if she develops any severe pain, numbness or tingling, color change in her fingers or any other concerning new problems.  Patient was discharged home in improved condition and supportive cares discussed.  Due to the right fifth metacarpal fracture, I did staffed this patient with Dr. Oliva.  Please see separate APC supervisory note.    Diagnosis:    ICD-10-CM    1. Displaced fracture of base of fifth metacarpal bone, right hand, initial encounter for closed fracture  S62.316A     (minimally displaced)      2. Contusion of left knee, initial encounter  S80.02XA       3. Fall, initial encounter  W19.XXXA       4. Skin abrasion  T14.8XXA     Right shin, Left shin, Right hand           Scribe Disclosure:  IVirginie, am serving as a scribe at 10:50 PM on 5/22/2023 to document services personally performed by Radha Edward PA-C based on my observations and the provider's statements to me.      I, Ginny Rosario, am serving as a scribe () on 5/22/2023 at 10:24 PM to personally document services performed by Radha Edward PA-C based on my observations and the provider's statements to me.      5/22/2023   Radha Edward PA-C Dewing, Jennifer C, PA-C  05/23/23  0046

## 2024-03-17 ENCOUNTER — HEALTH MAINTENANCE LETTER (OUTPATIENT)
Age: 44
End: 2024-03-17

## 2024-05-26 ENCOUNTER — HEALTH MAINTENANCE LETTER (OUTPATIENT)
Age: 44
End: 2024-05-26

## 2024-07-12 ENCOUNTER — APPOINTMENT (OUTPATIENT)
Dept: CT IMAGING | Facility: CLINIC | Age: 44
End: 2024-07-12
Attending: EMERGENCY MEDICINE
Payer: COMMERCIAL

## 2024-07-12 ENCOUNTER — HOSPITAL ENCOUNTER (EMERGENCY)
Facility: CLINIC | Age: 44
Discharge: HOME | End: 2024-07-12
Attending: EMERGENCY MEDICINE | Admitting: EMERGENCY MEDICINE
Payer: COMMERCIAL

## 2024-07-12 VITALS
TEMPERATURE: 97.6 F | BODY MASS INDEX: 27.23 KG/M2 | WEIGHT: 173.5 LBS | DIASTOLIC BLOOD PRESSURE: 87 MMHG | HEIGHT: 67 IN | SYSTOLIC BLOOD PRESSURE: 129 MMHG | HEART RATE: 89 BPM | OXYGEN SATURATION: 99 % | RESPIRATION RATE: 18 BRPM

## 2024-07-12 DIAGNOSIS — M54.9 UPPER BACK PAIN ON LEFT SIDE: ICD-10-CM

## 2024-07-12 DIAGNOSIS — R20.2 PARESTHESIA OF LEFT ARM: ICD-10-CM

## 2024-07-12 DIAGNOSIS — R20.2 LEFT LEG PARESTHESIAS: ICD-10-CM

## 2024-07-12 LAB
ANION GAP SERPL CALCULATED.3IONS-SCNC: 12 MMOL/L (ref 7–15)
ATRIAL RATE - MUSE: 89 BPM
BASOPHILS # BLD AUTO: 0 10E3/UL (ref 0–0.2)
BASOPHILS NFR BLD AUTO: 0 %
BUN SERPL-MCNC: 6.5 MG/DL (ref 6–20)
CALCIUM SERPL-MCNC: 8.9 MG/DL (ref 8.6–10)
CHLORIDE SERPL-SCNC: 104 MMOL/L (ref 98–107)
CREAT SERPL-MCNC: 0.67 MG/DL (ref 0.51–0.95)
DEPRECATED HCO3 PLAS-SCNC: 23 MMOL/L (ref 22–29)
DIASTOLIC BLOOD PRESSURE - MUSE: NORMAL MMHG
EGFRCR SERPLBLD CKD-EPI 2021: >90 ML/MIN/1.73M2
EOSINOPHIL # BLD AUTO: 0.1 10E3/UL (ref 0–0.7)
EOSINOPHIL NFR BLD AUTO: 1 %
ERYTHROCYTE [DISTWIDTH] IN BLOOD BY AUTOMATED COUNT: 13.2 % (ref 10–15)
GLUCOSE SERPL-MCNC: 110 MG/DL (ref 70–99)
HCT VFR BLD AUTO: 41.7 % (ref 35–47)
HGB BLD-MCNC: 13.9 G/DL (ref 11.7–15.7)
HOLD SPECIMEN: NORMAL
HOLD SPECIMEN: NORMAL
IMM GRANULOCYTES # BLD: 0 10E3/UL
IMM GRANULOCYTES NFR BLD: 0 %
INTERPRETATION ECG - MUSE: NORMAL
LYMPHOCYTES # BLD AUTO: 3.3 10E3/UL (ref 0.8–5.3)
LYMPHOCYTES NFR BLD AUTO: 36 %
MCH RBC QN AUTO: 27.7 PG (ref 26.5–33)
MCHC RBC AUTO-ENTMCNC: 33.3 G/DL (ref 31.5–36.5)
MCV RBC AUTO: 83 FL (ref 78–100)
MONOCYTES # BLD AUTO: 0.4 10E3/UL (ref 0–1.3)
MONOCYTES NFR BLD AUTO: 5 %
NEUTROPHILS # BLD AUTO: 5.3 10E3/UL (ref 1.6–8.3)
NEUTROPHILS NFR BLD AUTO: 58 %
NRBC # BLD AUTO: 0 10E3/UL
NRBC BLD AUTO-RTO: 0 /100
P AXIS - MUSE: 32 DEGREES
PLATELET # BLD AUTO: 335 10E3/UL (ref 150–450)
POTASSIUM SERPL-SCNC: 3.6 MMOL/L (ref 3.4–5.3)
PR INTERVAL - MUSE: 152 MS
QRS DURATION - MUSE: 90 MS
QT - MUSE: 354 MS
QTC - MUSE: 430 MS
R AXIS - MUSE: 38 DEGREES
RBC # BLD AUTO: 5.01 10E6/UL (ref 3.8–5.2)
SODIUM SERPL-SCNC: 139 MMOL/L (ref 135–145)
SYSTOLIC BLOOD PRESSURE - MUSE: NORMAL MMHG
T AXIS - MUSE: 42 DEGREES
TROPONIN T SERPL HS-MCNC: <6 NG/L
VENTRICULAR RATE- MUSE: 89 BPM
WBC # BLD AUTO: 9.2 10E3/UL (ref 4–11)

## 2024-07-12 PROCEDURE — 36415 COLL VENOUS BLD VENIPUNCTURE: CPT | Performed by: EMERGENCY MEDICINE

## 2024-07-12 PROCEDURE — 84484 ASSAY OF TROPONIN QUANT: CPT | Performed by: EMERGENCY MEDICINE

## 2024-07-12 PROCEDURE — 80048 BASIC METABOLIC PNL TOTAL CA: CPT | Performed by: EMERGENCY MEDICINE

## 2024-07-12 PROCEDURE — 70450 CT HEAD/BRAIN W/O DYE: CPT | Mod: XU

## 2024-07-12 PROCEDURE — 250N000011 HC RX IP 250 OP 636: Performed by: EMERGENCY MEDICINE

## 2024-07-12 PROCEDURE — 99285 EMERGENCY DEPT VISIT HI MDM: CPT | Mod: 25

## 2024-07-12 PROCEDURE — 85025 COMPLETE CBC W/AUTO DIFF WBC: CPT | Performed by: EMERGENCY MEDICINE

## 2024-07-12 PROCEDURE — 93005 ELECTROCARDIOGRAM TRACING: CPT

## 2024-07-12 PROCEDURE — 70496 CT ANGIOGRAPHY HEAD: CPT

## 2024-07-12 RX ORDER — LIDOCAINE 50 MG/G
1 PATCH TOPICAL EVERY 24 HOURS
Qty: 14 PATCH | Refills: 0 | Status: SHIPPED | OUTPATIENT
Start: 2024-07-12

## 2024-07-12 RX ORDER — IOPAMIDOL 755 MG/ML
500 INJECTION, SOLUTION INTRAVASCULAR ONCE
Status: COMPLETED | OUTPATIENT
Start: 2024-07-12 | End: 2024-07-12

## 2024-07-12 RX ORDER — METHYLPREDNISOLONE 4 MG
TABLET, DOSE PACK ORAL
Qty: 21 TABLET | Refills: 0 | Status: SHIPPED | OUTPATIENT
Start: 2024-07-12

## 2024-07-12 RX ADMIN — IOPAMIDOL 67 ML: 755 INJECTION, SOLUTION INTRAVENOUS at 17:28

## 2024-07-12 ASSESSMENT — ACTIVITIES OF DAILY LIVING (ADL)
ADLS_ACUITY_SCORE: 33
ADLS_ACUITY_SCORE: 35

## 2024-07-12 ASSESSMENT — COLUMBIA-SUICIDE SEVERITY RATING SCALE - C-SSRS
2. HAVE YOU ACTUALLY HAD ANY THOUGHTS OF KILLING YOURSELF IN THE PAST MONTH?: NO
6. HAVE YOU EVER DONE ANYTHING, STARTED TO DO ANYTHING, OR PREPARED TO DO ANYTHING TO END YOUR LIFE?: NO
1. IN THE PAST MONTH, HAVE YOU WISHED YOU WERE DEAD OR WISHED YOU COULD GO TO SLEEP AND NOT WAKE UP?: NO

## 2024-07-12 NOTE — ED PROVIDER NOTES
Emergency Department Note      History of Present Illness     Chief Complaint   Numbness    HPI   Ammy Todd is a 44 year old female with a history of diabetes, hypertension, and hyperlipidemia who presents to the ER for numbness. Patient reports left sided numbness from the neck to the foot including the arm and leg. She states that the symptoms started 5 days ago and she gets a shooting pain down her back and arm when sitting. Patient also endorses left arm stiffness and a pinching sensation in her left periscapular area. Ammy has not had this before.     Independent Historian   None      Past Medical History     Medical History and Problem List   Allergic rhinitis  ADD  Back pain  Diabetes type 2  Eczema  Fibromyalgia  Hyperlipidemia   Hypertension  Asthma   Fibromyalgia   OCD  PCOS   Tobacco use   Vitamin B12 deficiency     Medications   Albuterol   Aspirin 81 mg  Cyanocobalamin   Drospirenone  Lisinopril   Loratadine   Metformin   Montelukast   Cetirizine   Propranolol     Surgical History   Tubal ligation  Physical Exam     No data found.    Physical Exam  General: Patient is alert and cooperative.  HENT:  No facial weakness or asymmetry.   Eyes: EOMI. Normal conjunctiva.  Neck:  Normal range of motion and appearance.   Cardiovascular:  Normal rate.   Pulmonary/Chest:  Effort normal.  Abdominal: Soft. No distension or tenderness.     Musculoskeletal: Normal range of motion.  There is some tenderness in the left periscapular region, just medial.  There is no palpable area of swelling or muscle tension.  Neurological: oriented, normal strength throughout, including left upper and lower extremities.  Intact distal sensation, coordination and gait as well.  Skin: Warm and dry. No rash or bruising.   Psychiatric: Normal mood and affect. Normal behavior and judgement.    Diagnostics     Lab Results   Labs Ordered and Resulted from Time of ED Arrival to Time of ED Departure   BASIC METABOLIC PANEL - Abnormal        Result Value    Sodium 139      Potassium 3.6      Chloride 104      Carbon Dioxide (CO2) 23      Anion Gap 12      Urea Nitrogen 6.5      Creatinine 0.67      GFR Estimate >90      Calcium 8.9      Glucose 110 (*)    TROPONIN T, HIGH SENSITIVITY - Normal    Troponin T, High Sensitivity <6     CBC WITH PLATELETS AND DIFFERENTIAL    WBC Count 9.2      RBC Count 5.01      Hemoglobin 13.9      Hematocrit 41.7      MCV 83      MCH 27.7      MCHC 33.3      RDW 13.2      Platelet Count 335      % Neutrophils 58      % Lymphocytes 36      % Monocytes 5      % Eosinophils 1      % Basophils 0      % Immature Granulocytes 0      NRBCs per 100 WBC 0      Absolute Neutrophils 5.3      Absolute Lymphocytes 3.3      Absolute Monocytes 0.4      Absolute Eosinophils 0.1      Absolute Basophils 0.0      Absolute Immature Granulocytes 0.0      Absolute NRBCs 0.0         Imaging   CTA Head Neck with Contrast   Final Result   IMPRESSION:    HEAD CT:   Normal head CT.      HEAD CTA:   Normal CTA [place].      NECK CTA:   Normal neck CTA.         CT Head w/o Contrast   Final Result   IMPRESSION:    HEAD CT:   Normal head CT.      HEAD CTA:   Normal CTA [place].      NECK CTA:   Normal neck CTA.           Independent Interpretation   None    ED Course      Medications Administered   Medications   sodium chloride (PF) 0.9% PF flush 100 mL (80 mLs Intravenous $Given 7/12/24 1728)   iopamidol (ISOVUE-370) solution 500 mL (67 mLs Intravenous $Given 7/12/24 1728)       Discussion of Management   None    ED Course   ED Course as of 07/19/24 1728 Fri Jul 12, 2024 1652 I obtained the history and examined the patient as noted above.    1832 I rechecked patient and explained findings and plan of care.          Optional/Additional Documentation  None  Stress/Adjustment Disorders    Medical Decision Making / Diagnosis     CMS Diagnoses: None    MIPS       None    MDM   Ammy Todd is a 44 year old female with left  upper back pain and subjective paresthesias of both left upper and lower extremities.  Objectively normal exam.  MRI brain, MRA head/neck neg.  Likely musculoskeletal upper back pain, possible pinched nerve.  Advised lidoderm patch, medrol dosepak, prn zanaflex, otc nsaid,  follow up.     Disposition   The patient was discharged.     Diagnosis     ICD-10-CM    1. Upper back pain on left side  M54.9       2. Paresthesia of left arm  R20.2       3. Left leg paresthesias  R20.2            Discharge Medications   Discharge Medication List as of 7/12/2024  6:41 PM        START taking these medications    Details   lidocaine (LIDODERM) 5 % patch Place 1 patch onto the skin every 24 hours To prevent lidocaine toxicity, patient should be patch free for 12 hrs daily.Disp-14 patch, [id number]      methylPREDNISolone (MEDROL DOSEPAK) 4 MG tablet therapy pack Follow Package Directions, Disp-21 tablet, R-0, E-Prescribe      tiZANidine (ZANAFLEX) 4 MG tablet Take 1 tablet (4 mg) by mouth 3 times daily as needed for muscle spasms, Disp-15 tablet, R-0, E-Prescribe               Scribe Disclosure:  I, Kimani Chakraborty, am serving as a scribe at 5:04 PM on 7/12/2024 to document services personally performed by Iggy Braun MD based on my observations and the provider's statements to me.        Iggy Braun MD  07/19/24 6393

## 2024-07-12 NOTE — ED TRIAGE NOTES
Pt presents with concern for L sided numbness since Monday or Tuesday. Pt reports certain postures or positions makes it more noticeable. Was seen at urgent care, had EKG, sent here for troponin and further eval. Hx L shoulder injury, last flexeril last night, hx fibromyalgia. A & Ox4.      Triage Assessment (Adult)       Row Name 07/12/24 9614          Triage Assessment    Airway WDL WDL        Respiratory WDL    Respiratory WDL WDL        Cardiac WDL    Cardiac WDL WDL        Cognitive/Neuro/Behavioral WDL    Cognitive/Neuro/Behavioral WDL X  L sided numbness

## 2024-08-13 ENCOUNTER — ANCILLARY PROCEDURE (OUTPATIENT)
Dept: GENERAL RADIOLOGY | Facility: CLINIC | Age: 44
End: 2024-08-13
Attending: EMERGENCY MEDICINE
Payer: COMMERCIAL

## 2024-08-13 ENCOUNTER — OFFICE VISIT (OUTPATIENT)
Dept: URGENT CARE | Facility: URGENT CARE | Age: 44
End: 2024-08-13
Payer: COMMERCIAL

## 2024-08-13 VITALS
TEMPERATURE: 98.2 F | RESPIRATION RATE: 18 BRPM | DIASTOLIC BLOOD PRESSURE: 84 MMHG | WEIGHT: 173 LBS | OXYGEN SATURATION: 99 % | SYSTOLIC BLOOD PRESSURE: 131 MMHG | BODY MASS INDEX: 27.1 KG/M2 | HEART RATE: 87 BPM

## 2024-08-13 DIAGNOSIS — S93.601A FOOT SPRAIN, RIGHT, INITIAL ENCOUNTER: ICD-10-CM

## 2024-08-13 DIAGNOSIS — S93.601A FOOT SPRAIN, RIGHT, INITIAL ENCOUNTER: Primary | ICD-10-CM

## 2024-08-13 DIAGNOSIS — J02.9 PHARYNGITIS, UNSPECIFIED ETIOLOGY: ICD-10-CM

## 2024-08-13 LAB
DEPRECATED S PYO AG THROAT QL EIA: NEGATIVE
GROUP A STREP BY PCR: NOT DETECTED

## 2024-08-13 PROCEDURE — 73630 X-RAY EXAM OF FOOT: CPT | Mod: TC | Performed by: RADIOLOGY

## 2024-08-13 PROCEDURE — 87651 STREP A DNA AMP PROBE: CPT | Performed by: EMERGENCY MEDICINE

## 2024-08-13 PROCEDURE — 99203 OFFICE O/P NEW LOW 30 MIN: CPT | Performed by: EMERGENCY MEDICINE

## 2024-08-13 RX ORDER — ERGOCALCIFEROL 1.25 MG/1
CAPSULE, LIQUID FILLED ORAL
COMMUNITY
Start: 2024-02-26

## 2024-08-13 RX ORDER — OLOPATADINE HYDROCHLORIDE 1 MG/ML
1 SOLUTION/ DROPS OPHTHALMIC
COMMUNITY
Start: 2022-12-01

## 2024-08-13 RX ORDER — MAGNESIUM 200 MG
1 TABLET ORAL
COMMUNITY
Start: 2024-01-31

## 2024-08-13 RX ORDER — PREDNISOLONE ACETATE 10 MG/ML
1 SUSPENSION/ DROPS OPHTHALMIC
COMMUNITY
Start: 2024-01-10

## 2024-08-13 RX ORDER — MONTELUKAST SODIUM 10 MG/1
10 TABLET ORAL DAILY
COMMUNITY
Start: 2023-05-15

## 2024-08-13 RX ORDER — TIRZEPATIDE 15 MG/.5ML
INJECTION, SOLUTION SUBCUTANEOUS
COMMUNITY

## 2024-08-13 NOTE — PROGRESS NOTES
Assessment & Plan     Diagnosis:    ICD-10-CM    1. Foot sprain, right, initial encounter  S93.601A XR Foot Right G/E 3 Views      2. Pharyngitis, unspecified etiology  J02.9 Streptococcus A Rapid Screen w/Reflex to PCR - Clinic Collect     Group A Streptococcus PCR Throat Swab          Medical Decision Making:  Ammy Todd is a 44 year old female who presents for evaluation of right foot/ankle pain after mechanical inversion from fall yesterday.  CMS is intact distally in the extremity.  Pulses are normal and there are no signs of serious sequelae including compartment syndrome of the leg or foot.      Signs and symptoms are consistent with a foot sprain.  This involves ligaments of the lateral ankle and foot clinical exam. No tenderness over the malleoli and seems more in the foot. There are no signs of fracture or malalignment by plain radiograph on my read; radiology notes as per below.  No signs of septic arthritis, gout, pseudogout, dislocation, cellulitis.  Plan is for protected weightbearing with short CAM-walker boot, RICE treatment with ice 15 minutes on, 1 hour off, ace wrap.      Patient also with sore throat; rapid strep is negative. The rapid strep test is positive. I see no clinical evidence of  peritonsillar abscess, retropharyngeal abscess, Lemierre's Syndrome, epiglottis, or Karlo's angina.  I have recommended treatment with analgesics while we await PCR.  Return if increasing pain, change in voice, neck pain, vomiting, fever, or shortness of breath. Follow-up with primary physician if not improving in 3-5 days.      Anthony Ivy PA-C  [facility]    Subjective     Ammy Todd is a 44 year old female who presents to clinic today for the following health issues:  Chief Complaint   Patient presents with    Urgent Care     Neck/throat hurts,and right foot pain onset was yesterday        HPI  Patient is a 44 year old female who presents for evaluation of right ankle pain  after . This was mechanical in nature. This has been getting progressively worse over the past 24 hours.  Notes increased pain since injury. Patient has been walking; notes pain is worse with certain movements.   Patient denies any numbness, weakness, color change, inability to move the foot, knee or hip pain, other injuries    She also has sore throat starting yesterday; no other URI symptoms. Had strep exposure last week.      Review of Systems    See HPI    Objective      Vitals: /84   Pulse 87   Temp 98.2  F (36.8  C)   Resp 18   Wt 78.5 kg (173 lb)   LMP 07/01/2024 (Approximate)   SpO2 99%   BMI 27.10 kg/m        Patient Vitals for the past 24 hrs:   BP Temp Pulse Resp SpO2 Weight   08/13/24 1013 131/84 98.2  F (36.8  C) 87 18 99 % 78.5 kg (173 lb)       Vital signs reviewed by: Anthony Ivy PA-C    Physical Exam   Constitutional: Alert and active. Mild acute distress.  Head: Atraumatic. Normocephalic.  Mouth: posterior oropharynx is clear.  Ears: TMs normal bilaterally. Canals clear.  Cardiovascular: Regular rate and rhythm  Pulmonary/Chest: Effort normal. No respiratory distress. Lungs are clear to auscultation throughout.  Musculoskeletal/Skin: Right lower extremity: tenderness at the base of the 5th metatarsal.  non-tender over the malleoli. ROM normal. No gross deformity. No erythema, warmth, lymphangitis, open wounds. DP/PT pulses 2+. Compartments are soft throughout the leg. Negative boykin test; achilles intact.   Neurological: Alert and oriented x3. Strength and sensation is intact in the right lower extremity.      Labs/Imaging:  Results for orders placed or performed in visit on 08/13/24   XR Foot Right G/E 3 Views     Status: None    Narrative    XR FOOT RIGHT G/E 3 VIEWS 8/13/2024 10:33 AM     HISTORY: Foot sprain, right, initial encounter    COMPARISON: None.         Impression    IMPRESSION: The right foot is negative for fracture. No dislocation.    FREDDY FORREST MD          SYSTEM ID:  [id number]   Results for orders placed or performed in visit on 08/13/24   Streptococcus A Rapid Screen w/Reflex to PCR - Clinic Collect     Status: Normal    Specimen: Throat; Swab   Result Value Ref Range    Group A Strep antigen Negative Negative       Reading per radiology        Anthony Ivy PA-C, August 13, 2024

## 2025-06-14 ENCOUNTER — HEALTH MAINTENANCE LETTER (OUTPATIENT)
Age: 45
End: 2025-06-14

## (undated) RX ORDER — ACETAMINOPHEN 325 MG/1
TABLET ORAL
Status: DISPENSED
Start: 2023-05-22